# Patient Record
Sex: MALE | Race: BLACK OR AFRICAN AMERICAN | NOT HISPANIC OR LATINO | Employment: FULL TIME | URBAN - METROPOLITAN AREA
[De-identification: names, ages, dates, MRNs, and addresses within clinical notes are randomized per-mention and may not be internally consistent; named-entity substitution may affect disease eponyms.]

---

## 2017-11-30 ENCOUNTER — HOSPITAL ENCOUNTER (EMERGENCY)
Facility: HOSPITAL | Age: 27
Discharge: HOME | End: 2017-11-30
Attending: EMERGENCY MEDICINE
Payer: MEDICAID

## 2017-11-30 VITALS
BODY MASS INDEX: 23.7 KG/M2 | HEART RATE: 83 BPM | WEIGHT: 160 LBS | HEIGHT: 69 IN | TEMPERATURE: 97 F | SYSTOLIC BLOOD PRESSURE: 136 MMHG | DIASTOLIC BLOOD PRESSURE: 64 MMHG | OXYGEN SATURATION: 98 % | RESPIRATION RATE: 18 BRPM

## 2017-11-30 DIAGNOSIS — F41.9 ANXIETY: ICD-10-CM

## 2017-11-30 PROCEDURE — 93005 ELECTROCARDIOGRAM TRACING: CPT

## 2017-11-30 PROCEDURE — 93010 ELECTROCARDIOGRAM REPORT: CPT | Mod: ,,, | Performed by: INTERNAL MEDICINE

## 2017-11-30 PROCEDURE — 99283 EMERGENCY DEPT VISIT LOW MDM: CPT

## 2017-11-30 RX ORDER — HYDROXYZINE HYDROCHLORIDE 25 MG/1
25 TABLET, FILM COATED ORAL EVERY 6 HOURS
Qty: 12 TABLET | Refills: 0 | Status: SHIPPED | OUTPATIENT
Start: 2017-11-30 | End: 2018-01-04

## 2017-11-30 NOTE — ED NOTES
Patient identifiers for Giovani Garcia checked and correct.    LOC: The patient is awake, alert and aware of environment with an appropriate affect, the patient is oriented x 3 and speaking appropriately.  APPEARANCE: Patient resting comfortably and in no acute distress, patient is clean and well groomed, patient's clothing are properly fastened.  SKIN: The skin is warm and dry, patient has age appropriate skin turgor and moist mucus membranes, skin intact, no breakdown or bruising noted.  MUSCULOSKELETAL: Patient moving all extremities well, no obvious swelling or deformities noted.  RESPIRATORY: Airway is open and patent, respirations are spontaneous, patient has a normal effort and rate, no accessory muscle use noted.  Clear breath sounds bilaterally.  CARDIAC: Patient has a normal rate and rhythm, no periphreal edema noted, capillary refill < 3 seconds.  ABDOMEN: Soft and non tender to palpation, no distention noted.  Normoactive bowel sounds x4.  NEUROLOGIC: PERRL, facial expression is symmetrical, bilateral hand grasp equal and even, normal sensation in all extremities when touched with a finger.

## 2017-11-30 NOTE — ED PROVIDER NOTES
Encounter Date: 11/30/2017    SCRIBE #1 NOTE: I, Eloisa Reeder, am scribing for, and in the presence of,  Dr. Pryor. I have scribed the following portions of the note - the APC attestation and the EKG reading.       History     Chief Complaint   Patient presents with    Anxiety     has been feeling light headed with palpitations. states has had in the past and dx with anxiety, but stopped taking his meds     Afebrile 27-year-old male with past nuchal history of anxiety presents to the ED with reported anxiety attack.  States that he had an episode of hyperventilation, palpitations, shortness of breath and lightheadedness that began earlier this morning.  Denies any precipitating factors today although reports that he has been drinking more, smoking marijuana and smoking cigarettes more frequently recently.  He denies any alcohol or marijuana use today.  He denies any other drug use today.  He states that symptoms lasted several minutes and resolved resolved prior to arrival.  He does report that he continues with some anxiousness.  He denies any chest pain, headache, lightheadedness, shortness of breath, numbness, tingling or palpitations presently.  He does state that he was formally on anxiety medicine did help but he does not take it as he felt he did not need it any longer.  He has not tried any medications today for the symptoms.      The history is provided by the patient.     Review of patient's allergies indicates:  No Known Allergies  Past Medical History:   Diagnosis Date    Anxiety      Past Surgical History:   Procedure Laterality Date    ANTERIOR CRUCIATE LIGAMENT REPAIR      HERNIA REPAIR       History reviewed. No pertinent family history.  Social History   Substance Use Topics    Smoking status: Current Every Day Smoker     Packs/day: 1.00     Types: Cigarettes    Smokeless tobacco: Not on file    Alcohol use Yes      Comment: little bottle every other day     Review of Systems    Constitutional: Negative for fever.   HENT: Negative for congestion and sore throat.    Eyes: Negative for visual disturbance.   Respiratory: Positive for shortness of breath (resolved). Negative for cough, chest tightness and wheezing.    Cardiovascular: Positive for palpitations (resolved). Negative for chest pain and leg swelling.   Gastrointestinal: Negative for nausea and vomiting.   Genitourinary: Negative for decreased urine volume and dysuria.   Musculoskeletal: Negative for arthralgias, back pain and neck pain.   Skin: Negative for rash.   Neurological: Positive for light-headedness (resolved). Negative for weakness.   Hematological: Does not bruise/bleed easily.   Psychiatric/Behavioral: The patient is nervous/anxious.        Physical Exam     Initial Vitals [11/30/17 1209]   BP Pulse Resp Temp SpO2   136/64 83 18 97.4 °F (36.3 °C) 98 %      MAP       88         Physical Exam    Nursing note and vitals reviewed.  Constitutional: Vital signs are normal. He appears well-developed and well-nourished. He is cooperative.  Non-toxic appearance. He does not appear ill. No distress.   HENT:   Head: Normocephalic and atraumatic.   Eyes: Conjunctivae and lids are normal.   Neck: Trachea normal and normal range of motion. Neck supple. No stridor present.   Cardiovascular: Normal rate and regular rhythm.   Pulmonary/Chest: Breath sounds normal. No respiratory distress. He has no wheezes. He has no rhonchi.   Abdominal: Soft. Normal appearance. There is no tenderness.   Neurological: He is alert and oriented to person, place, and time. He has normal strength. GCS eye subscore is 4. GCS verbal subscore is 5. GCS motor subscore is 6.   Skin: Skin is warm, dry and intact. No rash noted.   Psychiatric: His speech is normal and behavior is normal. Thought content normal. His mood appears anxious.         ED Course   Procedures  Labs Reviewed - No data to display  EKG Readings: (Independently Interpreted)   Rhythm: Normal  Sinus Rhythm. Heart Rate: 79. ST Segments: Normal ST Segments. T Waves: Normal.          Medical Decision Making:   History:   Old Medical Records: I decided to obtain old medical records.  Initial Assessment:   Well appearing male with past history of anxiety and polysubstance use presents to the ED for evaluation of reported anxiety attack.  He states that symptoms feel similar to previous anxiety attacks and began earlier this morning and have since resolved.  Does report some continued anxiety.  States during the attack he had palpitations, hyperventilation, shortness of breath and lightheadedness.  He denies any symptoms at this time.  He has not taken any medications for symptoms.  Denies any SI or HI.  He has been on anxiety medicine in the past but stopped it as he felt it was not needed any longer.  Physical exam reveals well-appearing male in no obvious distress.  Does appear somewhat anxious.  Remaining exam with no significant abnormalities.  Differential Diagnosis:   Anxiety attack, generalized anxiety disorder, arrhythmia, acute coronary syndrome  Independently Interpreted Test(s):   I have ordered and independently interpreted EKG Reading(s) - see prior notes  Clinical Tests:   Medical Tests: Ordered and Reviewed  ED Management:  Patient endorses multiple times that symptoms are similar to previous anxiety attacks and that symptoms have resolved.  EKG reveals normal sinus rhythm with no abnormalities.  Patient remains well appearing in no distress was suspicion of emergent process at this time.  He continues to deny SI or HI.  I do note that he was on buspirone in the past and recommended him to follow up with primary care for restarting this medication.  He'll be sent home with short course of Atarax for anxiety. Patient was cautioned on when to return to ED. Patient verbalized understanding and agreement with the treatment plan                Attending Attestation:     Physician Attestation  Statement for NP/PA:   I discussed this assessment and plan of this patient with the NP/PA, but I did not personally examine the patient. The face to face encounter was performed by the NP/PA.    Other NP/PA Attestation Additions:      Medical Decision Making: I am in agreement with my physician assistant's assessment, treatment, and plan of care.                   ED Course      Clinical Impression:   The encounter diagnosis was Anxiety.    Disposition:   Disposition: Discharged  Condition: Stable                        CHRISTINE Boothe  12/06/17 1116       Rafael Pryor MD  12/12/17 7474

## 2018-01-04 ENCOUNTER — HOSPITAL ENCOUNTER (EMERGENCY)
Facility: HOSPITAL | Age: 28
Discharge: HOME | End: 2018-01-04
Attending: EMERGENCY MEDICINE
Payer: MEDICAID

## 2018-01-04 VITALS
SYSTOLIC BLOOD PRESSURE: 138 MMHG | RESPIRATION RATE: 18 BRPM | WEIGHT: 170 LBS | BODY MASS INDEX: 25.18 KG/M2 | DIASTOLIC BLOOD PRESSURE: 85 MMHG | TEMPERATURE: 99 F | HEART RATE: 89 BPM | HEIGHT: 69 IN | OXYGEN SATURATION: 99 %

## 2018-01-04 DIAGNOSIS — K04.7 DENTAL ABSCESS: Primary | ICD-10-CM

## 2018-01-04 PROCEDURE — 25000003 PHARM REV CODE 250: Performed by: PHYSICIAN ASSISTANT

## 2018-01-04 PROCEDURE — 96372 THER/PROPH/DIAG INJ SC/IM: CPT

## 2018-01-04 PROCEDURE — 63600175 PHARM REV CODE 636 W HCPCS: Performed by: PHYSICIAN ASSISTANT

## 2018-01-04 PROCEDURE — 99283 EMERGENCY DEPT VISIT LOW MDM: CPT | Mod: 25

## 2018-01-04 RX ORDER — CHLORHEXIDINE GLUCONATE ORAL RINSE 1.2 MG/ML
15 SOLUTION DENTAL 2 TIMES DAILY
Qty: 118 ML | Refills: 0 | Status: SHIPPED | OUTPATIENT
Start: 2018-01-04 | End: 2018-01-18

## 2018-01-04 RX ORDER — KETOROLAC TROMETHAMINE 30 MG/ML
30 INJECTION, SOLUTION INTRAMUSCULAR; INTRAVENOUS
Status: COMPLETED | OUTPATIENT
Start: 2018-01-04 | End: 2018-01-04

## 2018-01-04 RX ORDER — KETOROLAC TROMETHAMINE 10 MG/1
10 TABLET, FILM COATED ORAL EVERY 6 HOURS
Qty: 12 TABLET | Refills: 0 | Status: SHIPPED | OUTPATIENT
Start: 2018-01-04 | End: 2018-01-07

## 2018-01-04 RX ORDER — PENICILLIN V POTASSIUM 250 MG/1
500 TABLET, FILM COATED ORAL
Status: COMPLETED | OUTPATIENT
Start: 2018-01-04 | End: 2018-01-04

## 2018-01-04 RX ORDER — PENICILLIN V POTASSIUM 500 MG/1
500 TABLET, FILM COATED ORAL 4 TIMES DAILY
Qty: 28 TABLET | Refills: 0 | Status: SHIPPED | OUTPATIENT
Start: 2018-01-04 | End: 2018-01-11

## 2018-01-04 RX ADMIN — KETOROLAC TROMETHAMINE 30 MG: 30 INJECTION, SOLUTION INTRAMUSCULAR at 06:01

## 2018-01-04 RX ADMIN — PENICILLIN V POTASSIUM 500 MG: 250 TABLET, FILM COATED ORAL at 07:01

## 2018-01-05 NOTE — ED PROVIDER NOTES
Encounter Date: 1/4/2018       History     Chief Complaint   Patient presents with    Dental Pain     c/o pain to a cracked right lower tooth and right facial swelling x3 days     Giovani Garcia 27 y.o. a febrile male with no reported PMH and is a daily smoker presented to the ED with c/o right lower dental pain. He states that he cracked tooth some time ago and has known dental decay of the affected tooth. He noted more pain over the past several days and gradual onset of right-sided facial swelling. He states pain is exacerbated by palpation and mastication however is eating a porkchop in the room upon me entering.  He denies any fever, chills, neck pain, headache, or ear pain. He has not tried any medications for the symptoms.      The history is provided by the patient.     Review of patient's allergies indicates:  No Known Allergies  Past Medical History:   Diagnosis Date    Anxiety      Past Surgical History:   Procedure Laterality Date    ANTERIOR CRUCIATE LIGAMENT REPAIR      HERNIA REPAIR       No family history on file.  Social History   Substance Use Topics    Smoking status: Current Every Day Smoker     Types: Cigarettes    Smokeless tobacco: Not on file    Alcohol use Yes      Comment: little bottle every other day     Review of Systems   Constitutional: Negative for appetite change, chills and fever.   HENT: Positive for dental problem and facial swelling. Negative for ear pain, sore throat, trouble swallowing and voice change.    Eyes: Negative for pain.   Respiratory: Negative for cough and shortness of breath.    Cardiovascular: Negative for chest pain.   Gastrointestinal: Negative for nausea and vomiting.   Musculoskeletal: Negative for back pain, neck pain and neck stiffness.   Skin: Negative for color change, rash and wound.   Neurological: Negative for weakness and headaches.   Hematological: Does not bruise/bleed easily.       Physical Exam     Initial Vitals [01/04/18 1611]   BP Pulse  Resp Temp SpO2   117/82 86 16 98 °F (36.7 °C) 99 %      MAP       93.67         Physical Exam    Nursing note and vitals reviewed.  Constitutional: Vital signs are normal. He appears well-developed and well-nourished. He is cooperative.  Non-toxic appearance. He does not appear ill. No distress.   HENT:   Head:       Nose: Nose normal. Right sinus exhibits no maxillary sinus tenderness and no frontal sinus tenderness. Left sinus exhibits no maxillary sinus tenderness and no frontal sinus tenderness.   Mouth/Throat: Mucous membranes are not dry. Dental abscesses and dental caries present. No uvula swelling. No posterior oropharyngeal edema or posterior oropharyngeal erythema.       Circled region of face represents area of edema   Eyes: Conjunctivae and lids are normal.   Neck: Trachea normal and normal range of motion. Neck supple. No stridor present. No tracheal deviation present.   Cardiovascular: Normal rate and regular rhythm.   Pulmonary/Chest: Breath sounds normal. No respiratory distress. He has no wheezes. He has no rhonchi.   Abdominal: Soft. Normal appearance.   Musculoskeletal: Normal range of motion.   Neurological: He is alert and oriented to person, place, and time. GCS eye subscore is 4. GCS verbal subscore is 5. GCS motor subscore is 6.   Skin: Skin is warm, dry and intact. No rash noted.   Psychiatric: He has a normal mood and affect. His speech is normal and behavior is normal. Thought content normal.         ED Course   Procedures  Labs Reviewed - No data to display     Giovani Garcia 27 y.o. a febrile male with no reported PMH and is a daily smoker presented to the ED with c/o right lower dental pain. He states that he cracked tooth some time ago and has known dental decay of the affected tooth. He noted more pain over the past several days and gradual onset of right-sided facial swelling. He states pain is exacerbated by palpation and mastication however is eating a porkchop in the room upon me  entering.  He denies any fever, chills, neck pain, headache, or ear pain. He has not tried any medications for the symptoms. ROS positive for dental pain.  Physical exam reveals patient in some distress due to pain but otherwise well appearing. Face with swelling noted over the right mandible region with no trisumus. TTP of the circled tooth with moderate dental decay noted with some fluctuance that extends to the vestibule with area of drainable abscess. No active drainage, bleeding, tonsillar, peritonsillar swelling, erythema, sublingual swelling or lymphadenopathy at this time. FROM of the TMJ. TM's and ear canal free of abnormality. FROM of neck and fair ROM of extremities. Lungs clear, heart regular rate and rhythm.    DDX: pain due to dental caries; pulpitis, dental abscess    ED management: patient remains well appearing and afebrile however findings concerning for dental abscess at this time. He denies  Any other medical history and there does not appear to be a drainabke region. We will give fisrt dose of ABX in the ED and send home with instructions on warm compress and close follow up. Instructed to take Toradol with food and D/C should any GI symptoms arise.      Impression/Plan: The encounter diagnosis was Dental abscess. Discharged with veetid, Toradol and Peridex. Patient will follow up with Dentist.  Patient cautioned on when to return to ED.  Pt. Understands and agrees with current treatment plan                         ED Course      Clinical Impression:   The encounter diagnosis was Dental abscess.                           CHRISTINE Boothe  01/05/18 2295

## 2018-01-05 NOTE — ED NOTES
Pt here with right sided mouth pain and swelling --dental carries--states able to drink and eat some foods. denies urinary changes. Skin WDL. Pt is AAOx3. Wife at side. Instructions given on follow up,Rx that will be given and s/s to return for.

## 2019-12-06 ENCOUNTER — HOSPITAL ENCOUNTER (EMERGENCY)
Facility: HOSPITAL | Age: 29
Discharge: HOME | End: 2019-12-06
Attending: EMERGENCY MEDICINE
Payer: MEDICAID

## 2019-12-06 VITALS
DIASTOLIC BLOOD PRESSURE: 88 MMHG | BODY MASS INDEX: 24.44 KG/M2 | SYSTOLIC BLOOD PRESSURE: 140 MMHG | TEMPERATURE: 99 F | HEIGHT: 69 IN | HEART RATE: 86 BPM | OXYGEN SATURATION: 99 % | RESPIRATION RATE: 20 BRPM | WEIGHT: 165 LBS

## 2019-12-06 DIAGNOSIS — K04.01 ACUTE PULPITIS: Primary | ICD-10-CM

## 2019-12-06 PROCEDURE — 99284 EMERGENCY DEPT VISIT MOD MDM: CPT | Mod: 25

## 2019-12-06 PROCEDURE — 96372 THER/PROPH/DIAG INJ SC/IM: CPT

## 2019-12-06 PROCEDURE — 63600175 PHARM REV CODE 636 W HCPCS: Performed by: NURSE PRACTITIONER

## 2019-12-06 RX ORDER — CHLORHEXIDINE GLUCONATE ORAL RINSE 1.2 MG/ML
15 SOLUTION DENTAL 2 TIMES DAILY
Qty: 118 ML | Refills: 0 | Status: SHIPPED | OUTPATIENT
Start: 2019-12-06 | End: 2019-12-06 | Stop reason: SDUPTHER

## 2019-12-06 RX ORDER — KETOROLAC TROMETHAMINE 30 MG/ML
30 INJECTION, SOLUTION INTRAMUSCULAR; INTRAVENOUS
Status: COMPLETED | OUTPATIENT
Start: 2019-12-06 | End: 2019-12-06

## 2019-12-06 RX ORDER — KETOROLAC TROMETHAMINE 10 MG/1
10 TABLET, FILM COATED ORAL
Qty: 14 TABLET | Refills: 0 | Status: SHIPPED | OUTPATIENT
Start: 2019-12-06 | End: 2020-08-21 | Stop reason: CLARIF

## 2019-12-06 RX ORDER — CHLORHEXIDINE GLUCONATE ORAL RINSE 1.2 MG/ML
15 SOLUTION DENTAL 2 TIMES DAILY
Qty: 118 ML | Refills: 0 | Status: SHIPPED | OUTPATIENT
Start: 2019-12-06 | End: 2019-12-20

## 2019-12-06 RX ORDER — KETOROLAC TROMETHAMINE 10 MG/1
10 TABLET, FILM COATED ORAL
Qty: 14 TABLET | Refills: 0 | Status: SHIPPED | OUTPATIENT
Start: 2019-12-06 | End: 2019-12-06 | Stop reason: SDUPTHER

## 2019-12-06 RX ORDER — PENICILLIN V POTASSIUM 500 MG/1
500 TABLET, FILM COATED ORAL 4 TIMES DAILY
Qty: 28 TABLET | Refills: 0 | Status: SHIPPED | OUTPATIENT
Start: 2019-12-06 | End: 2019-12-13

## 2019-12-06 RX ORDER — PENICILLIN V POTASSIUM 500 MG/1
500 TABLET, FILM COATED ORAL 4 TIMES DAILY
Qty: 28 TABLET | Refills: 0 | Status: SHIPPED | OUTPATIENT
Start: 2019-12-06 | End: 2019-12-06 | Stop reason: SDUPTHER

## 2019-12-06 RX ADMIN — KETOROLAC TROMETHAMINE 30 MG: 30 INJECTION, SOLUTION INTRAMUSCULAR at 11:12

## 2019-12-06 NOTE — ED PROVIDER NOTES
Encounter Date: 12/6/2019       History     Chief Complaint   Patient presents with    Facial Swelling     rt. sided facial swelling, possible dental pain x2 days. moderate swelling noted to rt. cheek/lower jaw. reports painful chewing. denies fever. has taken no pain medications.       Giovani Garcia 29 y.o. afebrile male with no reported PMH and is a daily smoker who presents to the ED with c/o right lower dental pain x 2 days.  He states that he cracked tooth some time ago and has known dental decay of the affected tooth. He noted more pain over the past several days and gradual onset of right-sided facial swelling. He states pain is exacerbated by chewing.  Denies any difficulty swallowing or tolerating secretions.  Denies fever, chills, neck pain/stiffness, headache, or ear pain. He has not tried any medications for the symptoms.  Denies any other concerns at this time.    The history is provided by the patient.     Review of patient's allergies indicates:  No Known Allergies  Past Medical History:   Diagnosis Date    Anxiety      Past Surgical History:   Procedure Laterality Date    ANTERIOR CRUCIATE LIGAMENT REPAIR      HERNIA REPAIR       No family history on file.  Social History     Tobacco Use    Smoking status: Current Every Day Smoker     Types: Cigarettes   Substance Use Topics    Alcohol use: Yes     Comment: little bottle every other day    Drug use: Yes     Types: Marijuana     Review of Systems   Constitutional: Negative for fever.   HENT: Positive for dental problem and facial swelling. Negative for congestion.    Respiratory: Negative for cough.    Musculoskeletal: Negative for neck pain and neck stiffness.   Neurological: Negative for headaches.   Hematological: Does not bruise/bleed easily.   All other systems reviewed and are negative.      Physical Exam     Initial Vitals [12/06/19 1052]   BP Pulse Resp Temp SpO2   (!) 140/88 86 20 99.1 °F (37.3 °C) 99 %      MAP       --          Physical Exam    Vitals reviewed.  Constitutional: He appears well-developed and well-nourished.  Non-toxic appearance. He does not have a sickly appearance.   HENT:   Head: Atraumatic.   Right Ear: Hearing normal.   Left Ear: Hearing normal.   Nose: Nose normal.   Mouth/Throat: Oropharynx is clear and moist. No trismus in the jaw. Abnormal dentition. Dental caries present.       Circled area represents area of edema and pain.  Mild right-sided facial swelling noted.  No dental abscess noted.  No Georgi's or trismus.  Managing secretions without difficulty.     Eyes: Pupils are equal, round, and reactive to light.   Neck: Normal range of motion, full passive range of motion without pain and phonation normal. Neck supple.   No meningismus.   Cardiovascular: Regular rhythm.   Pulmonary/Chest: No respiratory distress.   Neurological: He is alert and oriented to person, place, and time.   Skin: Skin is warm.   Psychiatric: He has a normal mood and affect.         ED Course   Procedures  Labs Reviewed - No data to display       Imaging Results    None          Medical Decision Making:   History:   Old Medical Records: I decided to obtain old medical records.  Initial Assessment:    Giovani Garcia 29 y.o. afebrile male with no reported PMH and is a daily smoker who presents to the ED with c/o right lower dental pain x 2 days.  He states that he cracked tooth some time ago and has known dental decay of the affected tooth. He noted more pain over the past several days and gradual onset of right-sided facial swelling. He states pain is exacerbated by chewing.  Denies any difficulty swallowing or tolerating secretions.  Denies fever, chills, neck pain/stiffness, headache, or ear pain. He has not tried any medications for the symptoms.  Denies any other concerns at this time.        ED Management:  Patient appears to have acute pulpitis.  Based upon the H&P there are no signs of Georgi's angina, sublingual swelling, airway  compromise, facial cellulitis, sepsis, dehydration, or a fluctuant abscess to drain.  I believe the patient can be discharged home with antibiotics and pain medication.  No contraindications to NSAIDs.  Patient instructed to follow up with dentist in 2-3 days and to return to ED for any concerns or worsening symptoms.  Encouraged to stop smoking.  Patient verbalized understanding, compliance, and agreement with treatment plan.                                 Clinical Impression:       ICD-10-CM ICD-9-CM   1. Acute pulpitis K04.01 522.0                             Tod Villanueva NP  12/06/19 1212

## 2019-12-06 NOTE — DISCHARGE INSTRUCTIONS
Take prescribed medications as labeled.  Do not take other NSAIDs with Toradol such as ibuprofen, Motrin, Advil, Aleve, naproxen, or aspirin.  Follow-up with her dentist in 2-3 days return to ED for any concerns or worsening symptoms.  Stop smoking.

## 2020-08-21 ENCOUNTER — HOSPITAL ENCOUNTER (EMERGENCY)
Facility: HOSPITAL | Age: 30
Discharge: HOME | End: 2020-08-21
Attending: EMERGENCY MEDICINE
Payer: MEDICAID

## 2020-08-21 VITALS
WEIGHT: 170 LBS | BODY MASS INDEX: 25.18 KG/M2 | OXYGEN SATURATION: 100 % | SYSTOLIC BLOOD PRESSURE: 129 MMHG | HEIGHT: 69 IN | DIASTOLIC BLOOD PRESSURE: 80 MMHG | RESPIRATION RATE: 13 BRPM | TEMPERATURE: 98 F | HEART RATE: 78 BPM

## 2020-08-21 DIAGNOSIS — F41.9 ANXIETY: Primary | ICD-10-CM

## 2020-08-21 DIAGNOSIS — F19.10 POLYSUBSTANCE ABUSE: ICD-10-CM

## 2020-08-21 DIAGNOSIS — R00.2 PALPITATIONS: ICD-10-CM

## 2020-08-21 LAB — POCT GLUCOSE: 107 MG/DL (ref 70–110)

## 2020-08-21 PROCEDURE — 93010 EKG 12-LEAD: ICD-10-PCS | Mod: ,,, | Performed by: INTERNAL MEDICINE

## 2020-08-21 PROCEDURE — 99283 EMERGENCY DEPT VISIT LOW MDM: CPT | Mod: 25,ER

## 2020-08-21 PROCEDURE — 93010 ELECTROCARDIOGRAM REPORT: CPT | Mod: ,,, | Performed by: INTERNAL MEDICINE

## 2020-08-21 PROCEDURE — 82962 GLUCOSE BLOOD TEST: CPT | Mod: ER

## 2020-08-21 PROCEDURE — 93005 ELECTROCARDIOGRAM TRACING: CPT | Mod: ER

## 2020-08-21 PROCEDURE — 25000003 PHARM REV CODE 250: Mod: ER | Performed by: EMERGENCY MEDICINE

## 2020-08-21 RX ORDER — LORAZEPAM 1 MG/1
1 TABLET ORAL
Status: COMPLETED | OUTPATIENT
Start: 2020-08-21 | End: 2020-08-21

## 2020-08-21 RX ADMIN — LORAZEPAM 1 MG: 1 TABLET ORAL at 02:08

## 2020-08-21 NOTE — ED TRIAGE NOTES
Reports to ED via EMS c c/o anxiety. EMS reports pt admitted to marijuana. +Paranoia. States smoked marijuana before and has not felt this way and bought from a different dealer. Denies SI/HI/AH/VH. Hx of anxiety

## 2020-08-21 NOTE — ED PROVIDER NOTES
Encounter Date: 8/21/2020       History     Chief Complaint   Patient presents with    Anxiety     Reports to ED via EMS c c/o anxiety. EMS reports pt admitted to marijuana. +Paranoia. States smoked marijuana before and has not felt this way and bought from a different dealer. Denies SI/HI/AH/VH. Hx of anxiety      Chief complaint:  Dizzy  This is a 30-year-old says that he feels dizzy.  Patient admits to smoking marijuana as well as taking ecstasy pills.  He said that he began feeling strange about 30 min after taking the drugs.  He says that he feels lightheaded.  He admits that he has not eaten much over the last 2 days.  He says that he has palpitations.  He reports nausea but no vomiting.  No suicidal or homicidal ideation.  No chest pain or shortness of breath.  He says that he feels paranoid.    The history is provided by the patient.     Review of patient's allergies indicates:  No Known Allergies  Past Medical History:   Diagnosis Date    Anxiety      Past Surgical History:   Procedure Laterality Date    ANTERIOR CRUCIATE LIGAMENT REPAIR      HERNIA REPAIR       History reviewed. No pertinent family history.  Social History     Tobacco Use    Smoking status: Current Every Day Smoker     Packs/day: 1.00     Types: Cigarettes   Substance Use Topics    Alcohol use: Yes     Comment: little bottle every other day    Drug use: Yes     Types: Marijuana     Review of Systems   Constitutional: Negative for fever.   HENT: Negative for sore throat.    Respiratory: Negative for shortness of breath.    Cardiovascular: Negative for chest pain.   Gastrointestinal: Positive for nausea.   Genitourinary: Negative for dysuria.   Musculoskeletal: Negative for back pain.   Skin: Negative for rash.   Neurological: Positive for dizziness and light-headedness. Negative for weakness.   Psychiatric/Behavioral: Positive for agitation. Negative for suicidal ideas. The patient is nervous/anxious.        Physical Exam     Initial  Vitals [08/21/20 0223]   BP Pulse Resp Temp SpO2   (!) 143/89 81 19 97.7 °F (36.5 °C) 100 %      MAP       --         Physical Exam    Constitutional: He appears well-developed and well-nourished.   HENT:   Head: Normocephalic and atraumatic.   Eyes: EOM are normal. Pupils are equal, round, and reactive to light.   Neck: Neck supple.   Cardiovascular: Normal rate, regular rhythm and normal heart sounds.   Pulmonary/Chest: Breath sounds normal.   Abdominal: Soft. There is no abdominal tenderness. There is no rebound and no guarding.   Musculoskeletal: Normal range of motion.   Neurological: He is alert and oriented to person, place, and time. No cranial nerve deficit.   Skin: Skin is warm and dry.   Psychiatric: His mood appears anxious.         ED Course   Procedures  Labs Reviewed   POCT GLUCOSE MONITORING CONTINUOUS     EKG Readings: (Independently Interpreted)   Rhythm: Normal Sinus Rhythm. Heart Rate: 77. Ectopy: No Ectopy. Conduction: Normal. ST Segments: Normal ST Segments. T Waves: Normal. Clinical Impression: Normal Sinus Rhythm       Imaging Results    None          Medical Decision Making:   Initial Assessment:   30-year-old presents after using marijuana and ecstasy.  Patient says that he feels paranoid and dizzy.  His vital signs are stable.  Heart rate was 107 on my exam.  O2 sat is 100%  ED Management:  Orthostatics will be checked secondary to the dizziness.  EKG and  glucose will also be checked.  Patient will be given p.o. fluids and Ativan and reassessed  Mr. Garcia was observed in the ER for over an hour.  He felt much better after the Ativan.  His EKG was normal as well as his Accu-Chek.  He was not orthostatic.  I have advised him to refrain from using illegal drugs.  He plans to go home and sleep.                                 Clinical Impression:       ICD-10-CM ICD-9-CM   1. Anxiety  F41.9 300.00   2. Palpitations  R00.2 785.1   3. Polysubstance abuse  F19.10 305.90                                 Soheila Gregory MD  08/21/20 9318

## 2021-08-05 ENCOUNTER — HOSPITAL ENCOUNTER (EMERGENCY)
Facility: HOSPITAL | Age: 31
Discharge: HOME | End: 2021-08-05
Attending: EMERGENCY MEDICINE
Payer: MEDICAID

## 2021-08-05 VITALS
HEIGHT: 69 IN | SYSTOLIC BLOOD PRESSURE: 115 MMHG | OXYGEN SATURATION: 98 % | RESPIRATION RATE: 18 BRPM | HEART RATE: 96 BPM | DIASTOLIC BLOOD PRESSURE: 79 MMHG | BODY MASS INDEX: 24.44 KG/M2 | TEMPERATURE: 99 F | WEIGHT: 165 LBS

## 2021-08-05 DIAGNOSIS — U07.1 COVID-19 VIRUS INFECTION: Primary | ICD-10-CM

## 2021-08-05 LAB — SARS-COV-2 RDRP RESP QL NAA+PROBE: POSITIVE

## 2021-08-05 PROCEDURE — U0002 COVID-19 LAB TEST NON-CDC: HCPCS | Mod: ER | Performed by: PHYSICIAN ASSISTANT

## 2021-08-05 PROCEDURE — 25000003 PHARM REV CODE 250: Mod: ER | Performed by: PHYSICIAN ASSISTANT

## 2021-08-05 PROCEDURE — 99284 EMERGENCY DEPT VISIT MOD MDM: CPT | Mod: ER

## 2021-08-05 RX ORDER — BENZONATATE 200 MG/1
200 CAPSULE ORAL 3 TIMES DAILY PRN
Qty: 30 CAPSULE | Refills: 0 | Status: SHIPPED | OUTPATIENT
Start: 2021-08-05 | End: 2021-08-15

## 2021-08-05 RX ORDER — NAPROXEN 500 MG/1
500 TABLET ORAL 2 TIMES DAILY WITH MEALS
Qty: 14 TABLET | Refills: 0 | OUTPATIENT
Start: 2021-08-05 | End: 2022-02-06

## 2021-08-05 RX ORDER — ACETAMINOPHEN 500 MG
1000 TABLET ORAL
Status: COMPLETED | OUTPATIENT
Start: 2021-08-05 | End: 2021-08-05

## 2021-08-05 RX ADMIN — ACETAMINOPHEN 1000 MG: 500 TABLET ORAL at 11:08

## 2022-02-06 ENCOUNTER — HOSPITAL ENCOUNTER (EMERGENCY)
Facility: HOSPITAL | Age: 32
Discharge: HOME | End: 2022-02-06
Attending: EMERGENCY MEDICINE
Payer: MEDICAID

## 2022-02-06 VITALS
TEMPERATURE: 98 F | OXYGEN SATURATION: 99 % | HEART RATE: 80 BPM | BODY MASS INDEX: 23.04 KG/M2 | SYSTOLIC BLOOD PRESSURE: 154 MMHG | RESPIRATION RATE: 18 BRPM | DIASTOLIC BLOOD PRESSURE: 97 MMHG | WEIGHT: 156 LBS

## 2022-02-06 DIAGNOSIS — M54.50 ACUTE LOW BACK PAIN WITHOUT SCIATICA, UNSPECIFIED BACK PAIN LATERALITY: ICD-10-CM

## 2022-02-06 DIAGNOSIS — M54.2 NECK PAIN: Primary | ICD-10-CM

## 2022-02-06 DIAGNOSIS — R07.89 CHEST WALL PAIN: ICD-10-CM

## 2022-02-06 PROCEDURE — 99284 EMERGENCY DEPT VISIT MOD MDM: CPT | Mod: 25

## 2022-02-06 PROCEDURE — 63600175 PHARM REV CODE 636 W HCPCS: Performed by: EMERGENCY MEDICINE

## 2022-02-06 PROCEDURE — 96372 THER/PROPH/DIAG INJ SC/IM: CPT

## 2022-02-06 RX ORDER — KETOROLAC TROMETHAMINE 30 MG/ML
15 INJECTION, SOLUTION INTRAMUSCULAR; INTRAVENOUS
Status: COMPLETED | OUTPATIENT
Start: 2022-02-06 | End: 2022-02-06

## 2022-02-06 RX ORDER — DICLOFENAC SODIUM 75 MG/1
75 TABLET, DELAYED RELEASE ORAL 2 TIMES DAILY
Qty: 60 TABLET | Refills: 0 | Status: SHIPPED | OUTPATIENT
Start: 2022-02-06

## 2022-02-06 RX ADMIN — KETOROLAC TROMETHAMINE 15 MG: 30 INJECTION, SOLUTION INTRAMUSCULAR at 03:02
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
## 2022-02-06 NOTE — ED NOTES
Patient identifiers for Giovani Garcia checked and correct.  LOC: The patient is awake, alert and aware of environment with an appropriate affect, the patient is oriented x 3 and speaking appropriately.  APPEARANCE: Patient uncomfortable and in no acute distress, patient is clean and well groomed, patient's clothing are properly fastened.  SKIN: The skin is warm and dry, patient has normal skin turgor and moist mucus membranes  MUSKULOSKELETAL: Patient moving all extremities well. Slight swelling to anterior left wrist.  RESPIRATORY: Airway is open and patent, respirations are spontaneous, patient has a normal effort and rate.  CARDIAC: Chest tender to touch.

## 2022-10-31 ENCOUNTER — HOSPITAL ENCOUNTER (EMERGENCY)
Facility: HOSPITAL | Age: 32
Discharge: HOME | End: 2022-10-31
Attending: EMERGENCY MEDICINE
Payer: MEDICAID

## 2022-10-31 VITALS
SYSTOLIC BLOOD PRESSURE: 133 MMHG | DIASTOLIC BLOOD PRESSURE: 82 MMHG | OXYGEN SATURATION: 98 % | HEART RATE: 86 BPM | TEMPERATURE: 98 F | RESPIRATION RATE: 20 BRPM

## 2022-10-31 DIAGNOSIS — R53.1 GENERALIZED WEAKNESS: Primary | ICD-10-CM

## 2022-10-31 DIAGNOSIS — F16.91 HISTORY OF METHYLENEDIOXYMETHAMPHETAMINE (MDMA) USE: ICD-10-CM

## 2022-10-31 LAB
CTP QC/QA: YES
INFLUENZA A, MOLECULAR: NEGATIVE
INFLUENZA B, MOLECULAR: NEGATIVE
POCT GLUCOSE: 104 MG/DL (ref 70–110)
SARS-COV-2 RDRP RESP QL NAA+PROBE: NEGATIVE
SPECIMEN SOURCE: NORMAL

## 2022-10-31 PROCEDURE — 87502 INFLUENZA DNA AMP PROBE: CPT | Performed by: EMERGENCY MEDICINE

## 2022-10-31 PROCEDURE — 82962 GLUCOSE BLOOD TEST: CPT

## 2022-10-31 PROCEDURE — 99282 EMERGENCY DEPT VISIT SF MDM: CPT

## 2022-10-31 PROCEDURE — 87635 SARS-COV-2 COVID-19 AMP PRB: CPT | Performed by: EMERGENCY MEDICINE

## 2022-10-31 NOTE — ED PROVIDER NOTES
Encounter Date: 10/31/2022       History     Chief Complaint   Patient presents with    Fatigue     Pt states when woke up this am complains of generalized weakness, denies cough, fever, denies SOB,CP.denies n/v/ complains of left rib area pain that began yesterday, denies falls/trauma      Giovani Garcia is a 32 y.o. male who  has a past medical history of Anxiety.    The patient presents to the ED due to generalized fatigue.   Patient states he took an ecstasy pill yesterday afternoon around 1:00 in the afternoon. He felt fine last night but woke up this morning feeling weak and tired.  He denies any fever, headache, vision changes, N/V/D, CP, SOB, palpitations, abdominal pain, or any other concerns. Denies any other recent illness or known [facility] contacts. He did not take any medications today for his symptoms. Denies any other medical history and takes no medications regularly.         Review of patient's allergies indicates:  No Known Allergies  Past Medical History:   Diagnosis Date    Anxiety      Past Surgical History:   Procedure Laterality Date    ANTERIOR CRUCIATE LIGAMENT REPAIR      HERNIA REPAIR       No family history on file.  Social History     Tobacco Use    Smoking status: Every Day     Packs/day: 1.00     Types: Cigarettes   Substance Use Topics    Alcohol use: Yes     Comment: little bottle every other day    Drug use: Yes     Types: Marijuana     Review of Systems   Constitutional:  Positive for fatigue. Negative for chills and fever.   HENT:  Negative for sore throat.    Respiratory:  Negative for shortness of breath.    Cardiovascular:  Negative for chest pain.   Gastrointestinal:  Negative for constipation, diarrhea, nausea and vomiting.   Genitourinary:  Negative for dysuria, frequency and urgency.   Musculoskeletal:  Negative for back pain.   Skin:  Negative for rash and wound.   Neurological:  Negative for weakness.   Hematological:  Does not bruise/bleed easily.   Psychiatric/Behavioral:   Negative for agitation, behavioral problems and confusion.      Physical Exam     Initial Vitals [10/31/22 1147]   BP Pulse Resp Temp SpO2   136/81 (!) 113 20 98.8 °F (37.1 °C) 99 %      MAP       --         Physical Exam    Nursing note and vitals reviewed.  Constitutional: He appears well-developed and well-nourished. He is not diaphoretic. No distress.   Well-appearing, in no distress.    HENT:   Head: Normocephalic and atraumatic.   Mouth/Throat: Oropharynx is clear and moist.   Eyes: EOM are normal. Pupils are equal, round, and reactive to light.   Neck: No tracheal deviation present.   Cardiovascular:  Normal rate, regular rhythm, normal heart sounds and intact distal pulses.           Pulmonary/Chest: Breath sounds normal. No stridor. No respiratory distress.   Abdominal: Abdomen is soft. He exhibits no distension and no mass. There is no abdominal tenderness.   Musculoskeletal:         General: No edema. Normal range of motion.     Neurological: He is alert and oriented to person, place, and time. No cranial nerve deficit or sensory deficit.   Skin: Skin is warm and dry. Capillary refill takes less than 2 seconds. No rash noted.   Psychiatric: He has a normal mood and affect. His behavior is normal. Thought content normal.       ED Course   Procedures  Labs Reviewed   INFLUENZA A & B BY MOLECULAR   SARS-COV-2 RDRP GENE    Narrative:     This test utilizes isothermal nucleic acid amplification   technology to detect the SARS-CoV-2 RdRp nucleic acid segment.   The analytical sensitivity (limit of detection) is 125 genome   equivalents/mL.   A POSITIVE result implies infection with the SARS-CoV-2 virus;   the patient is presumed to be contagious.     A NEGATIVE result means that SARS-CoV-2 nucleic acids are not   present above the limit of detection. A NEGATIVE result should be   treated as presumptive. It does not rule out the possibility of   COVID-19 and should not be the sole basis for treatment decisions.    If COVID-19 is strongly suspected based on clinical and exposure   history, re-testing using an alternate molecular assay should be   considered.   This test is only for use under the Food and Drug   Administration s Emergency Use Authorization (EUA).   Commercial kits are provided by [employer].   Performance characteristics of the EUA have been independently   verified by [facility] Department of   Pathology and Laboratory Medicine.   _________________________________________________________________   The authorized Fact Sheet for Healthcare Providers and the authorized Fact   Sheet for Patients of the ID NOW COVID-19 are available on the FDA   website:     https://www.fda.gov/media/726787/download  https://www.fda.gov/media/687948/download          POCT GLUCOSE          Imaging Results    None          Medications - No data to display  Medical Decision Making:   History:   Old Medical Records: I decided to obtain old medical records.  Old Records Summarized: records from clinic visits and other records.       <> Summary of Records: History of anxiety.  Differential Diagnosis:   DDX:  Medication side effect, viral syndrome, dehydration, sleep issue.    On re-evaluation, the patient's status has improved.  After complete ED evaluation, clinical impression is most consistent with fatigue.  PCP follow-up within 2-3 days was recommended.    After taking into careful account the patient's history, physical exam findings, as well as empirical and objective data obtained throughout ED workup, I feel no emergent medical condition has been identified. No further evaluation or admission was felt to be required, and the patient is stable for discharge from the ED. The patient and any additional family present were updated with test results, overall clinical impression, and recommended further plan of care, including discharge instructions as provided and outpatient follow-up for continued evaluation and  management as needed. All questions were answered. The patient expressed understanding and agreed with current plan for discharge and follow-up plan of care. Strict ED return precautions were provided, including return/worsening of current symptoms, new symptoms, or any other concerns.               ED Course as of 11/01/22 1023   Mon Oct 31, 2022   1512 32-year-old male presents to ER with generalized weakness after taking 1 pill of ecstasy yesterday afternoon around 13:00.  Denies any fever, cough, nausea/vomiting, chest pain, shortness of breath, headache, syncope, or any other associated symptoms.  Vitals and exam benign.  Glucose within normal limits, COVID and flu negative.  Symptoms likely due to side effect of ecstasy use, counseled on cessation, stressed importance of p.o. fluids and rest, and recommend PCP follow-up as needed.  Return precautions given. 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 Take Medical Office Bldg. elevators.             Carson Blake MD  11/01/22 1026

## 2022-10-31 NOTE — ED TRIAGE NOTES
"Pt here with complaint feeling dizzy, generalized weakness with tremors onset yesterday after taking " x" pill. ST on tele, rate 100s. Denies hallucinations. + anxious.skin warm and dry. 2+ bilat radial pulse. No edema noted.   "

## 2022-11-11 DIAGNOSIS — R00.0 TACHYCARDIA: Primary | ICD-10-CM

## 2024-02-02 ENCOUNTER — HOSPITAL ENCOUNTER (EMERGENCY)
Facility: HOSPITAL | Age: 34
Discharge: HOME | End: 2024-02-02
Attending: EMERGENCY MEDICINE
Payer: MEDICAID

## 2024-02-02 VITALS
HEIGHT: 71 IN | BODY MASS INDEX: 23.8 KG/M2 | RESPIRATION RATE: 16 BRPM | WEIGHT: 170 LBS | OXYGEN SATURATION: 99 % | HEART RATE: 73 BPM | TEMPERATURE: 98 F | SYSTOLIC BLOOD PRESSURE: 119 MMHG | DIASTOLIC BLOOD PRESSURE: 86 MMHG

## 2024-02-02 DIAGNOSIS — U07.1 COVID: Primary | ICD-10-CM

## 2024-02-02 LAB
INFLUENZA A, MOLECULAR: NEGATIVE
INFLUENZA B, MOLECULAR: NEGATIVE
SARS-COV-2 RDRP RESP QL NAA+PROBE: POSITIVE
SPECIMEN SOURCE: NORMAL

## 2024-02-02 PROCEDURE — 99282 EMERGENCY DEPT VISIT SF MDM: CPT

## 2024-02-02 PROCEDURE — U0002 COVID-19 LAB TEST NON-CDC: HCPCS | Performed by: EMERGENCY MEDICINE

## 2024-02-02 PROCEDURE — 87502 INFLUENZA DNA AMP PROBE: CPT | Performed by: EMERGENCY MEDICINE

## 2024-02-02 NOTE — ED PROVIDER NOTES
"Encounter Date: 2/2/2024       History     Chief Complaint   Patient presents with    COVID-19 Concerns     Cough congestion fever chills     34-year-old male presents with complaint of subjective fevers, cough, congestion, myalgias.  Symptoms have been present for "1 or 2 days".  His chief concern is that he may have COVID-19.  He has not having hemoptysis or productive cough.  He has not having chest pain or shortness of breath.  He denies any leg swelling.      Review of patient's allergies indicates:  No Known Allergies  Past Medical History:   Diagnosis Date    Anxiety      Past Surgical History:   Procedure Laterality Date    ANTERIOR CRUCIATE LIGAMENT REPAIR      HERNIA REPAIR       History reviewed. No pertinent family history.  Social History     Tobacco Use    Smoking status: Every Day     Current packs/day: 1.00     Types: Cigarettes   Substance Use Topics    Alcohol use: Yes     Comment: little bottle every other day    Drug use: Yes     Types: Marijuana     Review of Systems  All other systems reviewed and negative except as noted in HPI    Physical Exam     Initial Vitals [02/02/24 1148]   BP Pulse Resp Temp SpO2   138/85 85 20 98.1 °F (36.7 °C) 100 %      MAP       --         Physical Exam  General: AO x 3, NAD. Well nourished. Well Developed  Head: Normocephalic and Atraumatic  HEENT: PERRLA. EOMI. OP Clear  Neck: Supple, Nontender in midline.  Cardiovascular: RRR. No m/r/g. 2+ Distal Pulses  Pulm/Chest: Chest nontender. Lungs clear to auscultation. No respiratory distress.  Abdomen: Nontender. Nondistended. No rigidity, rebound, or guarding  MSK: extremities atraumatic x 4. Gait normal  Ext: no clubbing, cyanosis, or edema  Neuro: GCS 15. CN II-XII intact. Intact symmetric sensation, strength, DTR x 4 extremities  Skin: no bullae, petechiae, or purpura. Warm, dry, and intact.  Psych: normal mood and affect.    ED Course   Procedures  Labs Reviewed   SARS-COV-2 RNA AMPLIFICATION, QUAL - Abnormal; " Notable for the following components:       Result Value    SARS-CoV-2 RNA, Amplification, Qual Positive (*)     All other components within normal limits   INFLUENZA A & B BY MOLECULAR          Imaging Results    None          Medications - No data to display  Medical Decision Making  Patient nontoxic and well appearing. Normal vital signs and no respiratory distress. COVID test positive. Counseled on home management of symptoms. Strict return precautions and anticipatory guidance given.      Amount and/or Complexity of Data Reviewed  Labs: ordered. Decision-making details documented in ED Course.    Risk  OTC drugs.  Decision regarding hospitalization.               ED Course as of 02/04/24 0938 Fri Feb 02, 2024   1457 COVID-19 Rapid Screening(!) [DS]   1457 Influenza A & B by Molecular  COVID test positive.  Patient was in no respiratory distress.  Counseled on appropriate home supportive care. [DS]      ED Course User Index  [DS] Thor Childs MD                           Clinical Impression:  Final diagnoses:  [U07.1] COVID (Primary)          ED Disposition Condition    Discharge Stable          ED Prescriptions    None       Follow-up Information    None          Thor Childs MD  02/04/24 0938

## 2024-02-02 NOTE — ED NOTES
Pt presents to ED via personal transport w/ c/o dry cough, congestion, generalized weakness, and chills x2 days. Denies sick contacts. States he wants to get checked for Covid.    Patient identifiers for Giovani Garcia 34 y.o. male checked and correct.  Chief Complaint   Patient presents with    COVID-19 Concerns     Cough congestion fever chills     Past Medical History:   Diagnosis Date    Anxiety      LOC: Patient is awake, alert, and aware of environment with an appropriate affect. Patient is oriented x 3 and speaking appropriately.  APPEARANCE: Patient resting comfortably and in no acute distress. Patient is clean and well groomed, patient's clothing is properly fastened.  HEENT: +nasal congestion  SKIN: The skin is warm and dry. Patient has normal skin turgor and moist mucus membranes. Skin is intact; no bruising or breakdown noted.  MUSKULOSKELETAL: Patient is moving all extremities well, no obvious deformities noted. Pulses intact.   RESPIRATORY: Airway is open and patent. Respirations are spontaneous and non-labored with normal effort and rate, BBS=clear  CARDIAC: No peripheral edema noted.   ABDOMEN: No distention noted. Bowel sounds active in all 4 quadrants. Soft and non-tender upon palpation.  NEUROLOGICAL: PERRL. Facial expression is symmetrical. Hand grasps are equal bilaterally. Normal sensation in all extremities when touched with finger.

## 2024-02-02 NOTE — DISCHARGE INSTRUCTIONS
Take over the counter acetaminophen 1000mg or ibuprofen 600mg every 6 hours as needed for pain and inflammation    Return to the emergency department immediately if any new or concerning symptoms occur